# Patient Record
Sex: FEMALE | Race: BLACK OR AFRICAN AMERICAN | NOT HISPANIC OR LATINO | Employment: UNEMPLOYED | URBAN - METROPOLITAN AREA
[De-identification: names, ages, dates, MRNs, and addresses within clinical notes are randomized per-mention and may not be internally consistent; named-entity substitution may affect disease eponyms.]

---

## 2017-10-12 ENCOUNTER — HOSPITAL ENCOUNTER (EMERGENCY)
Facility: HOSPITAL | Age: 27
Discharge: HOME | End: 2017-10-12
Attending: EMERGENCY MEDICINE
Payer: MEDICAID

## 2017-10-12 VITALS
WEIGHT: 164 LBS | OXYGEN SATURATION: 97 % | RESPIRATION RATE: 20 BRPM | SYSTOLIC BLOOD PRESSURE: 106 MMHG | TEMPERATURE: 99 F | HEART RATE: 73 BPM | HEIGHT: 67 IN | BODY MASS INDEX: 25.74 KG/M2 | DIASTOLIC BLOOD PRESSURE: 64 MMHG

## 2017-10-12 DIAGNOSIS — T78.40XA ALLERGIC REACTION, INITIAL ENCOUNTER: Primary | ICD-10-CM

## 2017-10-12 DIAGNOSIS — R22.0 FACIAL SWELLING: ICD-10-CM

## 2017-10-12 DIAGNOSIS — L50.9 HIVES: ICD-10-CM

## 2017-10-12 LAB
B-HCG UR QL: NEGATIVE
CTP QC/QA: YES

## 2017-10-12 PROCEDURE — 99284 EMERGENCY DEPT VISIT MOD MDM: CPT | Mod: 25

## 2017-10-12 PROCEDURE — 63600175 PHARM REV CODE 636 W HCPCS: Performed by: NURSE PRACTITIONER

## 2017-10-12 PROCEDURE — 96372 THER/PROPH/DIAG INJ SC/IM: CPT

## 2017-10-12 PROCEDURE — 81025 URINE PREGNANCY TEST: CPT | Performed by: EMERGENCY MEDICINE

## 2017-10-12 PROCEDURE — 25000003 PHARM REV CODE 250: Performed by: NURSE PRACTITIONER

## 2017-10-12 RX ORDER — LIDOCAINE HYDROCHLORIDE 20 MG/ML
SOLUTION OROPHARYNGEAL
Status: DISCONTINUED
Start: 2017-10-12 | End: 2017-10-13 | Stop reason: HOSPADM

## 2017-10-12 RX ORDER — DICYCLOMINE HYDROCHLORIDE 10 MG/5ML
SOLUTION ORAL
Status: DISCONTINUED
Start: 2017-10-12 | End: 2017-10-13 | Stop reason: HOSPADM

## 2017-10-12 RX ORDER — DIPHENHYDRAMINE HCL 50 MG
50 CAPSULE ORAL
Status: COMPLETED | OUTPATIENT
Start: 2017-10-12 | End: 2017-10-12

## 2017-10-12 RX ORDER — MAG HYDROX/ALUMINUM HYD/SIMETH 200-200-20
SUSPENSION, ORAL (FINAL DOSE FORM) ORAL
Status: DISCONTINUED
Start: 2017-10-12 | End: 2017-10-13 | Stop reason: HOSPADM

## 2017-10-12 RX ORDER — FAMOTIDINE 20 MG/1
20 TABLET, FILM COATED ORAL 2 TIMES DAILY
Qty: 10 TABLET | Refills: 0 | Status: SHIPPED | OUTPATIENT
Start: 2017-10-12 | End: 2018-01-21

## 2017-10-12 RX ORDER — PREDNISONE 20 MG/1
40 TABLET ORAL DAILY
Qty: 10 TABLET | Refills: 0 | Status: SHIPPED | OUTPATIENT
Start: 2017-10-12 | End: 2017-10-17

## 2017-10-12 RX ORDER — EPINEPHRINE 1 MG/ML
0.3 INJECTION, SOLUTION INTRACARDIAC; INTRAMUSCULAR; INTRAVENOUS; SUBCUTANEOUS
Status: DISCONTINUED | OUTPATIENT
Start: 2017-10-12 | End: 2017-10-12

## 2017-10-12 RX ORDER — EPINEPHRINE 0.3 MG/.3ML
1 INJECTION SUBCUTANEOUS ONCE AS NEEDED
Qty: 0.3 ML | Refills: 0 | Status: SHIPPED | OUTPATIENT
Start: 2017-10-12 | End: 2018-01-21

## 2017-10-12 RX ORDER — PREDNISONE 20 MG/1
TABLET ORAL
Status: DISCONTINUED
Start: 2017-10-12 | End: 2017-10-13 | Stop reason: HOSPADM

## 2017-10-12 RX ORDER — FAMOTIDINE 20 MG/1
20 TABLET, FILM COATED ORAL
Status: COMPLETED | OUTPATIENT
Start: 2017-10-12 | End: 2017-10-12

## 2017-10-12 RX ORDER — EPINEPHRINE 1 MG/ML
0.3 INJECTION, SOLUTION INTRACARDIAC; INTRAMUSCULAR; INTRAVENOUS; SUBCUTANEOUS
Status: COMPLETED | OUTPATIENT
Start: 2017-10-12 | End: 2017-10-12

## 2017-10-12 RX ORDER — PREDNISONE 20 MG/1
60 TABLET ORAL
Status: COMPLETED | OUTPATIENT
Start: 2017-10-12 | End: 2017-10-12

## 2017-10-12 RX ADMIN — LIDOCAINE HYDROCHLORIDE: 20 SOLUTION ORAL; TOPICAL at 10:10

## 2017-10-12 RX ADMIN — FAMOTIDINE 20 MG: 20 TABLET, FILM COATED ORAL at 08:10

## 2017-10-12 RX ADMIN — PREDNISONE 60 MG: 20 TABLET ORAL at 10:10

## 2017-10-12 RX ADMIN — DIPHENHYDRAMINE HYDROCHLORIDE 50 MG: 50 CAPSULE ORAL at 08:10

## 2017-10-12 RX ADMIN — EPINEPHRINE 0.3 MG: 1 INJECTION, SOLUTION, CONCENTRATE INTRAVENOUS at 08:10

## 2017-10-13 NOTE — DISCHARGE INSTRUCTIONS
Please return to the Emergency Department for any new or worsening symptoms including: worsening rash, itching, difficulty breathing or swallowing, fever, chest pain, shortness of breath, loss of consciousness, dizziness, weakness, or any other concerns.     Please follow up with an Allergist for further testing within in the week. If you do not have one, you may contact the one listed on your discharge paperwork or you may also call the [facility] Appointment Desk at [phone] to schedule an appointment with one.     Please take all medication as prescribed.  Please avoid cashews and nuts.  Prednisone once a day for 5 days.  Pepcid twice a day for 5 days.  You can use Benadryl at night.  This medication will make you tired.    Use the EpiPen as needed for ALLERGIC reaction symptoms.  If you suspend please immediately return to the emergency department.

## 2017-10-13 NOTE — ED TRIAGE NOTES
"Patient reports "I guess i'm allergic to cashews. I had one about one hour ago and I started breaking out in hives. Patient has generalized hive that are itchy and swelling around eyes. Patient reports taking benadryl right after episode started one hour ago. AAOX4. Patient denies difficulty breathing or swallowing.   "

## 2017-10-13 NOTE — ED PROVIDER NOTES
Triage Evaluation:    This patient was seen and evaluated in triage while awaiting a room in the main ED.      This is a 27-year-old female who presents to the ED with complaints of an ALLERGIC reaction after eating cashews for the first time.  Patient states she immediately began to itch all over and her face began to swell.  On exam, she does have hives all over her body.  There is moderate bilateral periorbital swelling.  No oropharyngeal swelling, difficulty breathing or speaking.    First doses Benadryl, Pepcid and IM epinephrine given.  Her vital signs are stable.  She is awaiting a room in the main ED and is under observation in [facility].     Ashlie Rebolledo NP  10/12/17 1871

## 2017-10-13 NOTE — ED PROVIDER NOTES
Encounter Date: 10/12/2017    SCRIBE #1 NOTE: I, Nohemy Lawrence, am scribing for, and in the presence of,  ELIZABETH Zaman. I have scribed the following portions of the note - Other sections scribed: HPI/ROS.       History     Chief Complaint   Patient presents with    Allergic Reaction     Pt is having allergic reaction 1hr ago after eating cashew ice cream. Pt is has hives all over and puffy eyes. No SOB. Patient took benadryl prior to arrival     CC: Allergic Reaction    HPI: This 27 y.o. Female with no pertinent medical history presents to the ED for an evaluation of acute onset edema to the bilateral eyes with redness and itchy bumps all over body that immediately happened after eating cashew ice cream for the first time earlier today. Overall, patient reports she is feeling better just noting she presently has heartburn. No alleviating or exacerbating factors. No prior tx. Patient denies fever, chills, trouble breathing/swallowing, numbness, and weakness.       The history is provided by the patient. No  was used.     Review of patient's allergies indicates:   Allergen Reactions    Cashew nut Swelling     History reviewed. No pertinent past medical history.  Past Surgical History:   Procedure Laterality Date    CORNEAL TRANSPLANT  2011    LEFT    CORNEAL TRANSPLANT  2012    RIGHT    EYE SURGERY       Family History   Problem Relation Age of Onset    Cataracts Mother     No Known Problems Father     No Known Problems Sister     No Known Problems Brother     No Known Problems Maternal Aunt     No Known Problems Maternal Uncle     No Known Problems Paternal Aunt     No Known Problems Paternal Uncle     No Known Problems Maternal Grandmother     No Known Problems Maternal Grandfather     No Known Problems Paternal Grandmother     No Known Problems Paternal Grandfather     Amblyopia Neg Hx     Blindness Neg Hx     Cancer Neg Hx     Diabetes Neg Hx     Glaucoma Neg Hx      Hypertension Neg Hx     Macular degeneration Neg Hx     Retinal detachment Neg Hx     Strabismus Neg Hx     Stroke Neg Hx     Thyroid disease Neg Hx      Social History   Substance Use Topics    Smoking status: Former Smoker     Packs/day: 1.00    Smokeless tobacco: Not on file    Alcohol use No     Review of Systems   Constitutional: Negative for chills and fever.   HENT: Negative for congestion, ear pain, rhinorrhea, sore throat, trouble swallowing and voice change.    Eyes: Positive for redness. Negative for pain and visual disturbance.        (+) Edema to the bilateral eyes   Respiratory: Negative for cough, chest tightness and shortness of breath.    Cardiovascular: Negative for chest pain.   Gastrointestinal: Negative for abdominal pain, diarrhea, nausea and vomiting.        (+) Heartburn   Musculoskeletal: Negative for back pain and neck pain.   Skin: Positive for rash (hives). Negative for wound.        (+) Facial Swelling   Neurological: Negative for dizziness, syncope, weakness and headaches.   Psychiatric/Behavioral: Negative for confusion.       Physical Exam     Initial Vitals [10/12/17 1933]   BP Pulse Resp Temp SpO2   (!) 145/67 98 16 98.8 °F (37.1 °C) 98 %      MAP       93         Physical Exam    Nursing note and vitals reviewed.  Constitutional: She appears well-developed and well-nourished. She is not diaphoretic. She is cooperative.  Non-toxic appearance. She does not have a sickly appearance. No distress.   HENT:   Head: Normocephalic and atraumatic.   Right Ear: Tympanic membrane and external ear normal.   Left Ear: Tympanic membrane and external ear normal.   Nose: Nose normal.   Mouth/Throat: Uvula is midline, oropharynx is clear and moist and mucous membranes are normal. No trismus in the jaw. No oropharyngeal exudate, posterior oropharyngeal edema, posterior oropharyngeal erythema or tonsillar abscesses.   No posterior pharyngeal swelling or edema.   Midline uvula.  No stridor,  drooling, or evidence of airway swelling.   Eyes: Conjunctivae and EOM are normal. Pupils are equal, round, and reactive to light.   Periorbital swelling noted.  Swelling noted of upper and lower eyelids.   Neck: Full passive range of motion without pain. No tracheal deviation present.   Cardiovascular: Normal rate, regular rhythm, normal heart sounds and intact distal pulses. Exam reveals no gallop and no friction rub.    No murmur heard.  Pulses:       Radial pulses are 2+ on the right side, and 2+ on the left side.   Pulmonary/Chest: Effort normal and breath sounds normal. No stridor. No tachypnea and no bradypnea. No respiratory distress. She has no wheezes. She has no rhonchi. She has no rales. She exhibits no tenderness.   Abdominal: There is no tenderness. There is no guarding.   Musculoskeletal: Normal range of motion.   Lymphadenopathy:     She has no cervical adenopathy.   Neurological: She is alert and oriented to person, place, and time. She has normal strength. No sensory deficit. Coordination and gait normal. GCS eye subscore is 4. GCS verbal subscore is 5. GCS motor subscore is 6.   Skin: Skin is warm, dry and intact. Capillary refill takes less than 2 seconds. Rash noted. Rash is urticarial. No cyanosis or erythema. Nails show no clubbing.   Urticarial rash/hives noted to upper extremities.   Psychiatric: She has a normal mood and affect. Her speech is normal and behavior is normal. Judgment and thought content normal.         ED Course   Procedures  Labs Reviewed   POCT URINE PREGNANCY                   APC / Resident Notes:   This is an evaluation of a 27 y.o. female that presents to the Emergency Department for facial swelling and hives after eating ice cream of cashews.  She reports never eating cashews before.  She does eat peanuts, walnuts, and other nuts without any issues. Physical Exam shows a non-toxic, afebrile, and well appearing female.  Ears and throat without evidence of infection.   There is no drooling, oral swelling, stridor, or evidence of respiratory distress.  There is swelling noted of the upper and lower eyelids as well as mild periorbital swelling. Breath sounds are clear and equal to auscultation.  Heart regular rhythm.  There are urticarial lesions/hives noted on the lateral upper arms.  Per nursing staff, the swelling has improved since the patient initially arrived.  The patient was seen and evaluated in the pit area treated with medications awaiting a room.  There is no room available so she is moved to the [facility] as her symptoms were improving.  Vital Signs Are Reassuring.  Reassessment 1 hour after receiving oral steroids, the patient reports the swelling has improved in the face.  On my visual inspection, the swelling does appear to be subsiding more than on my initial contact.  Airway remains patent, with this swelling, drooling, stridor, or evidence of respiratory distress.  Reassessment of the skin shows complete resolution of urticarial lesions/hives to the upper extremities.  RESULTS: UPT negative.    My overall impression is ALLERGIC reaction and facial swelling. I considered, but at this time, do not suspect anaphylactic shock, continued anaphylactic ALLERGIC reaction, airway compromise, pneumonia, OM, OE, strep pharyngitis.    ED Course: Benadryl, Pepcid, prednisone, GI cocktail. D/C Meds: EpiPen, Pepcid, Benadryl when necessary with sedation precautions, and prednisone. Additional D/C Information: Avoid specifically cashews in use caution with other nuts or legumes. The diagnosis, treatment plan, instructions for follow-up and reevaluation with ALLERGY or primary care as well as ED return precautions were discussed and understanding was verbalized. All questions or concerns have been addressed. This case was discussed with Dr. Scott who is in agreement with my assessment and plan. JORDAN Valenzuela, FNP-C        Scribe Attestation:   Scribe #1: I performed the above  scribed service and the documentation accurately describes the services I performed. I attest to the accuracy of the note.    Attending Attestation:           Physician Attestation for Scribe:  Physician Attestation Statement for Scribe #1: I, ELIZABETH Zaman, reviewed documentation, as scribed by Nohemy Lawrence in my presence, and it is both accurate and complete.                 ED Course      Clinical Impression:   The primary encounter diagnosis was Allergic reaction, initial encounter. Diagnoses of Facial swelling and Hives were also pertinent to this visit.    Disposition:   Disposition: Discharged  Condition: Stable                        ELIZABETH Zaman  10/13/17 0034

## 2018-01-21 ENCOUNTER — HOSPITAL ENCOUNTER (EMERGENCY)
Facility: OTHER | Age: 28
Discharge: HOME | End: 2018-01-21
Attending: EMERGENCY MEDICINE
Payer: MEDICAID

## 2018-01-21 VITALS
BODY MASS INDEX: 26.68 KG/M2 | HEIGHT: 67 IN | DIASTOLIC BLOOD PRESSURE: 80 MMHG | OXYGEN SATURATION: 99 % | RESPIRATION RATE: 16 BRPM | HEART RATE: 73 BPM | WEIGHT: 170 LBS | SYSTOLIC BLOOD PRESSURE: 117 MMHG | TEMPERATURE: 99 F

## 2018-01-21 DIAGNOSIS — H92.02 OTALGIA OF LEFT EAR: ICD-10-CM

## 2018-01-21 DIAGNOSIS — K08.89 DENTALGIA: Primary | ICD-10-CM

## 2018-01-21 LAB
B-HCG UR QL: NEGATIVE
CTP QC/QA: YES

## 2018-01-21 PROCEDURE — 25000003 PHARM REV CODE 250: Performed by: NURSE PRACTITIONER

## 2018-01-21 PROCEDURE — 81025 URINE PREGNANCY TEST: CPT | Performed by: EMERGENCY MEDICINE

## 2018-01-21 PROCEDURE — 99283 EMERGENCY DEPT VISIT LOW MDM: CPT | Mod: 25

## 2018-01-21 RX ORDER — ACETAMINOPHEN AND CODEINE PHOSPHATE 300; 30 MG/1; MG/1
1 TABLET ORAL
Status: COMPLETED | OUTPATIENT
Start: 2018-01-21 | End: 2018-01-21

## 2018-01-21 RX ORDER — IBUPROFEN 800 MG/1
800 TABLET ORAL EVERY 8 HOURS PRN
Qty: 20 TABLET | Refills: 0 | Status: SHIPPED | OUTPATIENT
Start: 2018-01-21

## 2018-01-21 RX ADMIN — ACETAMINOPHEN AND CODEINE PHOSPHATE 1 TABLET: 300; 30 TABLET ORAL at 04:01

## 2018-01-21 NOTE — ED PROVIDER NOTES
Encounter Date: 1/21/2018       History     Chief Complaint   Patient presents with    Otalgia     c/o left ear pain/headache x2 days. pt tstates she thinks it's related to her wisdom teeth coming in     The history is provided by the patient. No  was used.   Otalgia   This is a new problem. The current episode started two days ago. There is pain in the left ear. The problem occurs constantly. The problem has been unchanged. The pain is at a severity of 5/10 (Patient describes the pain as an aching pain.). Pertinent negatives include no ear discharge, no headaches, no hearing loss, no rhinorrhea, no sore throat, no abdominal pain, no diarrhea, no vomiting, no neck pain, no cough and no rash. Associated symptoms comments: Patient reports pain to her left wisdom tooth that has been going on for the past 2 months.. Past medical history comments: Patient saw a dentist several weeks ago and was instructed that she needed to see an oral surgeon and was referred to [facility] .  Patient has yet to make an appointment..     Review of patient's allergies indicates:   Allergen Reactions    Cashew nut Swelling     History reviewed. No pertinent past medical history.  Past Surgical History:   Procedure Laterality Date    CORNEAL TRANSPLANT  2011    LEFT    CORNEAL TRANSPLANT  2012    RIGHT    EYE SURGERY       Family History   Problem Relation Age of Onset    Cataracts Mother     No Known Problems Father     No Known Problems Sister     No Known Problems Brother     No Known Problems Maternal Aunt     No Known Problems Maternal Uncle     No Known Problems Paternal Aunt     No Known Problems Paternal Uncle     No Known Problems Maternal Grandmother     No Known Problems Maternal Grandfather     No Known Problems Paternal Grandmother     No Known Problems Paternal Grandfather     Amblyopia Neg Hx     Blindness Neg Hx     Cancer Neg Hx     Diabetes Neg Hx     Glaucoma Neg Hx      Hypertension Neg Hx     Macular degeneration Neg Hx     Retinal detachment Neg Hx     Strabismus Neg Hx     Stroke Neg Hx     Thyroid disease Neg Hx      Social History   Substance Use Topics    Smoking status: Former Smoker     Packs/day: 1.00    Smokeless tobacco: Never Used    Alcohol use No     Review of Systems   Constitutional: Negative.  Negative for appetite change, chills and fever.   HENT: Positive for dental problem and ear pain. Negative for congestion, ear discharge, hearing loss, mouth sores, rhinorrhea, sore throat and trouble swallowing.    Eyes: Negative.  Negative for pain and discharge.   Respiratory: Negative.  Negative for cough and shortness of breath.    Cardiovascular: Negative.  Negative for chest pain.   Gastrointestinal: Negative.  Negative for abdominal distention, abdominal pain, constipation, diarrhea, nausea, rectal pain and vomiting.   Endocrine: Negative.    Genitourinary: Negative.  Negative for dyspareunia, dysuria, hematuria, vaginal bleeding, vaginal discharge and vaginal pain.   Musculoskeletal: Negative for back pain, myalgias and neck pain.   Skin: Negative.  Negative for rash.   Allergic/Immunologic: Negative.    Neurological: Negative.  Negative for facial asymmetry, speech difficulty, light-headedness and headaches.   Hematological: Negative.    Psychiatric/Behavioral: Negative.  Negative for agitation, dysphoric mood and sleep disturbance.   All other systems reviewed and are negative.      Physical Exam     Initial Vitals [01/21/18 1538]   BP Pulse Resp Temp SpO2   117/80 73 16 99.2 °F (37.3 °C) 99 %      MAP       92.33         Physical Exam    Nursing note and vitals reviewed.  Constitutional: She appears well-developed and well-nourished. She is not diaphoretic.  Non-toxic appearance. She does not appear ill. No distress.   HENT:   Head: Normocephalic and atraumatic.   Right Ear: Hearing, tympanic membrane, external ear and ear canal normal.   Left Ear:  Hearing, tympanic membrane, external ear and ear canal normal.   Mouth/Throat: Uvula is midline and oropharynx is clear and moist. No uvula swelling. No oropharyngeal exudate, posterior oropharyngeal edema, posterior oropharyngeal erythema or tonsillar abscesses.       Eyes: Conjunctivae are normal. Right eye exhibits no discharge. Left eye exhibits no discharge.   Neck: Normal range of motion.   Cardiovascular: Normal rate, regular rhythm, normal heart sounds and intact distal pulses. Exam reveals no gallop and no friction rub.    No murmur heard.  Pulmonary/Chest: Breath sounds normal. No respiratory distress. She has no wheezes. She has no rhonchi. She has no rales. She exhibits no tenderness.   Musculoskeletal: Normal range of motion.   Neurological: She is alert and oriented to person, place, and time.   Skin: Skin is warm and dry. No rash noted.   Psychiatric: She has a normal mood and affect. Her behavior is normal. Judgment and thought content normal.         ED Course   Procedures  Labs Reviewed   POCT URINE PREGNANCY             Medical Decision Making:   Initial Assessment:   Dentalgia, otalgia  Differential Diagnosis:   Otitis, dental fracture, foreign body  ED Management:  Patient instructed that her pain is likely due from referred pain from her wisdom tooth as opposed to any abnormality with the ear itself.  The patient is instructed to schedule an appointment with [facility] utensil as she was instructed to by her dentist when she went to that last tetanus appointment several weeks ago.  The patient was given Tylenol 3 in the ER and discharged home on ibuprofen.  Patient instructed to eat soft foods.  Patient instructed to return to the ER as needed if symptoms worsen or fail to improve.  The patient verbalized an understanding of discharge instructions and treatment plan.                   ED Course      Clinical Impression:   The primary encounter diagnosis was Dentalgia. A diagnosis of Otalgia of  left ear was also pertinent to this visit.                           Toussaint [facility] III, FNP  01/21/18 1552